# Patient Record
(demographics unavailable — no encounter records)

---

## 2024-11-03 NOTE — REVIEW OF SYSTEMS
[Murmur] : murmur [Premenarchal] : premenarchal [Headache] : headache [Eczema] : eczema [Negative] : Endocrine [Fever] : no fever [Pneumonia] : no pneumonia [Chest Pain] : no chest pain [FreeTextEntry9] : receives PT for R knee - periphyseal edema versus stress reaction on MRI [FreeTextEntry5] : h/o palpitations - saw Cardio and cleared per their note [FreeTextEntry8] : h/o urinary frequency  [de-identified] : saw Neuro - normal MRI head; no difficulty ambulating  [de-identified] : autoimmune hemolytic anemia [de-identified] : environmental allergies

## 2024-11-03 NOTE — HISTORY OF PRESENT ILLNESS
[de-identified] : Shaista is a 13 year old girl with h/o autoimmune hemolytic anemia (followed by Dr. Dillon of Heme/Onc, last Hgb 7/2024 was normal 12.5), h/o urinary frequency (resolved, saw Uro), h/o H pylori (successfully treated), previously seen for nausea, vomiting, abdominal pain, and constipation.  The workup was essentially normal including upper endoscopy and gastric emptying scan.  KUB with large amount of stool in colon and rectum.   She was last seen in April 2022.  Symptoms had resolved but she returns today due to abdominal pain which started 2 weeks ago.    The pain is located in the epigastrium and supraumbilical area without radiation.  It occurs almost daily including weekends, and is intermittent over the course of the day.  The pain is crampy and burning in quality, and 7 out of 10 in severity. Shaista is unsure whether the pain is related to eating.  It does not improve after stooling but she experiences incomplete evacuation.  Pepto unhelpful with abdominal pain but helps with stooling.  Mylanta helps with abdominal pain and constipation.  Omeprazole 40 mg prn helps with abdominal pain.  She has not had a workup.    When Shaista has menses, the upper abdominal pain is severe and she also has lower abdominal pain. She awakens overnight and has missed school from pain only when has menses.  Midol helps with pain.  PMD recommended GYN for severe pain, nausea and vomiting during menses. LMP was 10/21-10/25. Then did not have abdominal pain until yesterday.  Shaista has a good appetite and is gaining weight.  There is no report of dysphagia or odynophagia.   Shaista stools 2-3 times almost daily, [place] 3, without straining or rectal pain.  The last BM was yesterday. There is no blood, mucus, steatorrhea, fecal accidents or diarrhea.  She is not gassy.   [de-identified] : 9/2021 CRP 0.42 (ULN 0.49). 10/2021 CMP significant for glucose 63.  Normal CBC, lipase, TSH, TTG IgA with serum IgA.  11/23/21 Chem with severe hemolysis, K7.8. Normal BUN/Cr. Bicarb 22. [de-identified] : 5/2021  BITA neg [de-identified] : 3/2021 KUB without large stool burden. 10/27/21 normal  [de-identified] : 11/23/21 Patchy erythema in antrum > gastric body. Biopsies with mild chronic inactive gastritis. Hp neg.    [de-identified] : 10/27/21 normal  [de-identified] : 11/23/21 EKG read by Cardio as normal sinus rhythm and possible LVH. 2/2022 GE scan normal for both liquid and solid phase.

## 2024-11-03 NOTE — REVIEW OF SYSTEMS
[Murmur] : murmur [Premenarchal] : premenarchal [Headache] : headache [Eczema] : eczema [Negative] : Endocrine [Fever] : no fever [Pneumonia] : no pneumonia [Chest Pain] : no chest pain [FreeTextEntry5] : h/o palpitations - saw Cardio and cleared per their note [FreeTextEntry9] : receives PT for R knee - periphyseal edema versus stress reaction on MRI [FreeTextEntry8] : h/o urinary frequency  [de-identified] : saw Neuro - normal MRI head; no difficulty ambulating  [de-identified] : autoimmune hemolytic anemia [de-identified] : environmental allergies

## 2024-11-03 NOTE — PHYSICAL EXAM
[Well Developed] : well developed [Well Nourished] : well nourished [NAD] : in no acute distress [Alert and Active] : alert and active [Moist & Pink Mucous Membranes] : moist and pink mucous membranes [Normal Oropharynx] : the oropharynx was normal [CTAB] : lungs clear to auscultation bilaterally [Regular Rate and Rhythm] : regular rate and rhythm [Normal S1, S2] : normal S1 and S2 [Soft] : soft  [Obese] : obese [Normal Bowel Sounds] : normal bowel sounds [No Back Lesion] : no back lesion [Normal rectal exam] : exam was normal [Rectal Exam Deferred] : rectal exam was deferred [Verbal] : verbal [Well-Perfused] : well-perfused [Interactive] : interactive [Normal Position] : normal position [icteric] : anicteric [Oral Ulcers] : no oral ulcers [Respiratory Distress] : no respiratory distress  [Tender] : non tender [Stool Palpable] : no stool palpable [Tags] : no skin tags  [Fissure] : no anal fissures  [Hemorrhoids] : no hemorrhoids [Joint Swelling] : no joint swelling [Cyanosis] : no cyanosis [Rash] : no rash [Jaundice] : no jaundice [de-identified] : difficult to assess HSM due to habitus

## 2024-11-03 NOTE — CONSULT LETTER
[Dear  ___] : Dear ~RIVAS, [Courtesy Letter:] : I had the pleasure of seeing your patient, [unfilled], in my office today. [Please see my note below.] : Please see my note below. [Consult Closing:] : Thank you very much for allowing me to participate in the care of this patient.  If you have any questions, please do not hesitate to contact me. [Sincerely,] : Sincerely, [Dear  ___] : Dear  [unfilled], [FreeTextEntry3] : Micaela Edouard MD \par  [facility]

## 2024-11-03 NOTE — PAST MEDICAL HISTORY
[At Term] : at term [Normal Vaginal Route] : by normal vaginal route [None] : there were no delivery complications [] : There were no problems passing meconium within 24 - 48 hrs of life [FreeTextEntry1] : 6lbs

## 2024-11-03 NOTE — REVIEW OF SYSTEMS
[Murmur] : murmur [Premenarchal] : premenarchal [Headache] : headache [Eczema] : eczema [Negative] : Endocrine [Fever] : no fever [Pneumonia] : no pneumonia [Chest Pain] : no chest pain [FreeTextEntry9] : receives PT for R knee - periphyseal edema versus stress reaction on MRI [FreeTextEntry5] : h/o palpitations - saw Cardio and cleared per their note [FreeTextEntry8] : h/o urinary frequency  [de-identified] : saw Neuro - normal MRI head; no difficulty ambulating  [de-identified] : autoimmune hemolytic anemia [de-identified] : environmental allergies

## 2024-11-03 NOTE — CONSULT LETTER
[Dear  ___] : Dear ~RIVAS, [Courtesy Letter:] : I had the pleasure of seeing your patient, [unfilled], in my office today. [Please see my note below.] : Please see my note below. [Consult Closing:] : Thank you very much for allowing me to participate in the care of this patient.  If you have any questions, please do not hesitate to contact me. [Sincerely,] : Sincerely, [Dear  ___] : Dear  [unfilled], [FreeTextEntry3] : Micaela Edouard MD \par  [facility]

## 2024-11-03 NOTE — ASSESSMENT
[Discussed with Family to Call in ____ week(s) for Test Results] : discussed with family to call in [unfilled] week(s) to obtain test results and with update on child's condition.  Family should call sooner if clinically indicated. [Educated Patient & Family about Diagnosis] : educated the patient and family about the diagnosis [FreeTextEntry1] : ASSESSMENT: 1.  History of nausea, vomiting and abdominal pain improved on PPI and oil of oregano - all symptoms resolved; normal workup including upper endoscopy and gastric emptying scan essentially  2.  Acute abdominal pain - improved on PPI and Mylanta, ?from constipation  3.  Incomplete evacuation - improved on PeptoBismol and Mylanta 4.  History of urinary frequency, not improved after constipation improved - saw Uro 5.  Autoimmune hemolytic anemia - follows Heme  6.  Headaches - follows Neuro, normal head MRI 7.  EKG read by Cardio as normal sinus rhythm and possible LVH - cleared by Cardio 8.  Obesity  PLAN: -  Reviewed chart including prior GI notes and subspecialty notes. -  KUB to decide about a cleanout.   If minimal stool or if cleanout unhelpful with pain, will order labs.   -  Pt deferred seeing someone for weight.  -  Follow up in GI clinic on 11/13 at 4:30p as overbook (in [place]).  Family to schedule next available appt after 3:30p in [place] office since they prefer after school hours.  Family to call if problems.  All questions answered.   ADDENDUM: Oct 31, 2024 - Reviewed KUB dated 10/31/24 from [facility] Radiology: moderate stool burden in colon and rectum (on my review, official report not available yet).  Requested RN to call family with result and plan for cleanout with Fleet enema, followed by Magnesium Citrate 10 oz x2 (both e-scribed).  After cleanout, monitor abdominal pain and give Miralax 1/2 cap once daily (e-scribed). Titrate dose as needed to produce more stool than baseline (max 1 cap per day). Decrease dose if have diarrhea.

## 2024-11-03 NOTE — ASSESSMENT
[Discussed with Family to Call in ____ week(s) for Test Results] : discussed with family to call in [unfilled] week(s) to obtain test results and with update on child's condition.  Family should call sooner if clinically indicated. [Educated Patient & Family about Diagnosis] : educated the patient and family about the diagnosis [FreeTextEntry1] : ASSESSMENT: 1.  History of nausea, vomiting and abdominal pain improved on PPI and oil of oregano - all symptoms resolved; normal workup including upper endoscopy and gastric emptying scan essentially  2.  Acute abdominal pain - improved on PPI and Mylanta, ?from constipation  3.  Incomplete evacuation - improved on PeptoBismol and Mylanta 4.  History of urinary frequency, not improved after constipation improved - saw Uro 5.  Autoimmune hemolytic anemia - follows Heme  6.  Headaches - follows Neuro, normal head MRI 7.  EKG read by Cardio as normal sinus rhythm and possible LVH - cleared by Cardio 8.  Obesity  PLAN: -  Reviewed chart including prior GI notes and subspecialty notes. -  KUB to decide about a cleanout.   If minimal stool or if cleanout unhelpful with pain, will order labs.   -  Pt deferred seeing someone for weight.  -  Follow up in GI clinic on 11/13 at 4:30p as overbook (in [place]).  Family to schedule next available appt after 3:30p in [place] office since they prefer after school hours.  Family to call if problems.  All questions answered.   ADDENDUM: Oct 31, 2024 - Reviewed KUB dated 10/31/24 from [facility] Radiology: moderate stool burden in colon and rectum (on my review, official report not available yet).  Requested RN to call family with result and plan for cleanout with Fleet enema, followed by Magnesium Citrate 10 oz x2 (both e-scribed).  After cleanout, monitor abdominal pain and give Miralax 1/2 cap once daily (e-scribed). Titrate dose as needed to produce more stool than baseline (max 1 cap per day). Decrease dose if have diarrhea.

## 2024-11-03 NOTE — HISTORY OF PRESENT ILLNESS
[de-identified] : Shaista is a 13 year old girl with h/o autoimmune hemolytic anemia (followed by Dr. Dillon of Heme/Onc, last Hgb 7/2024 was normal 12.5), h/o urinary frequency (resolved, saw Uro), h/o H pylori (successfully treated), previously seen for nausea, vomiting, abdominal pain, and constipation.  The workup was essentially normal including upper endoscopy and gastric emptying scan.  KUB with large amount of stool in colon and rectum.   She was last seen in April 2022.  Symptoms had resolved but she returns today due to abdominal pain which started 2 weeks ago.    The pain is located in the epigastrium and supraumbilical area without radiation.  It occurs almost daily including weekends, and is intermittent over the course of the day.  The pain is crampy and burning in quality, and 7 out of 10 in severity. Shaista is unsure whether the pain is related to eating.  It does not improve after stooling but she experiences incomplete evacuation.  Pepto unhelpful with abdominal pain but helps with stooling.  Mylanta helps with abdominal pain and constipation.  Omeprazole 40 mg prn helps with abdominal pain.  She has not had a workup.    When Shaista has menses, the upper abdominal pain is severe and she also has lower abdominal pain. She awakens overnight and has missed school from pain only when has menses.  Midol helps with pain.  PMD recommended GYN for severe pain, nausea and vomiting during menses. LMP was 10/21-10/25. Then did not have abdominal pain until yesterday.  Shaista has a good appetite and is gaining weight.  There is no report of dysphagia or odynophagia.   Shaista stools 2-3 times almost daily, [place] 3, without straining or rectal pain.  The last BM was yesterday. There is no blood, mucus, steatorrhea, fecal accidents or diarrhea.  She is not gassy.   [de-identified] : 9/2021 CRP 0.42 (ULN 0.49). 10/2021 CMP significant for glucose 63.  Normal CBC, lipase, TSH, TTG IgA with serum IgA.  11/23/21 Chem with severe hemolysis, K7.8. Normal BUN/Cr. Bicarb 22. [de-identified] : 5/2021  BITA neg [de-identified] : 3/2021 KUB without large stool burden. 10/27/21 normal  [de-identified] : 10/27/21 normal  [de-identified] : 11/23/21 Patchy erythema in antrum > gastric body. Biopsies with mild chronic inactive gastritis. Hp neg.    [de-identified] : 11/23/21 EKG read by Cardio as normal sinus rhythm and possible LVH. 2/2022 GE scan normal for both liquid and solid phase.

## 2024-11-03 NOTE — PHYSICAL EXAM
[Well Developed] : well developed [Well Nourished] : well nourished [NAD] : in no acute distress [Alert and Active] : alert and active [Moist & Pink Mucous Membranes] : moist and pink mucous membranes [Normal Oropharynx] : the oropharynx was normal [CTAB] : lungs clear to auscultation bilaterally [Regular Rate and Rhythm] : regular rate and rhythm [Normal S1, S2] : normal S1 and S2 [Soft] : soft  [Obese] : obese [Normal Bowel Sounds] : normal bowel sounds [No Back Lesion] : no back lesion [Normal rectal exam] : exam was normal [Rectal Exam Deferred] : rectal exam was deferred [Verbal] : verbal [Well-Perfused] : well-perfused [Interactive] : interactive [Normal Position] : normal position [icteric] : anicteric [Oral Ulcers] : no oral ulcers [Respiratory Distress] : no respiratory distress  [Tender] : non tender [Stool Palpable] : no stool palpable [Tags] : no skin tags  [Fissure] : no anal fissures  [Hemorrhoids] : no hemorrhoids [Joint Swelling] : no joint swelling [Cyanosis] : no cyanosis [Rash] : no rash [Jaundice] : no jaundice [de-identified] : difficult to assess HSM due to habitus

## 2024-11-03 NOTE — PHYSICAL EXAM
[Well Developed] : well developed [Well Nourished] : well nourished [NAD] : in no acute distress [Alert and Active] : alert and active [Moist & Pink Mucous Membranes] : moist and pink mucous membranes [Normal Oropharynx] : the oropharynx was normal [CTAB] : lungs clear to auscultation bilaterally [Regular Rate and Rhythm] : regular rate and rhythm [Normal S1, S2] : normal S1 and S2 [Soft] : soft  [Obese] : obese [Normal Bowel Sounds] : normal bowel sounds [No Back Lesion] : no back lesion [Normal rectal exam] : exam was normal [Rectal Exam Deferred] : rectal exam was deferred [Verbal] : verbal [Well-Perfused] : well-perfused [Interactive] : interactive [Normal Position] : normal position [icteric] : anicteric [Oral Ulcers] : no oral ulcers [Respiratory Distress] : no respiratory distress  [Tender] : non tender [Stool Palpable] : no stool palpable [Tags] : no skin tags  [Fissure] : no anal fissures  [Hemorrhoids] : no hemorrhoids [Joint Swelling] : no joint swelling [Cyanosis] : no cyanosis [Rash] : no rash [Jaundice] : no jaundice [de-identified] : difficult to assess HSM due to habitus

## 2024-11-03 NOTE — HISTORY OF PRESENT ILLNESS
[de-identified] : Shaista is a 13 year old girl with h/o autoimmune hemolytic anemia (followed by Dr. Dillon of Heme/Onc, last Hgb 7/2024 was normal 12.5), h/o urinary frequency (resolved, saw Uro), h/o H pylori (successfully treated), previously seen for nausea, vomiting, abdominal pain, and constipation.  The workup was essentially normal including upper endoscopy and gastric emptying scan.  KUB with large amount of stool in colon and rectum.   She was last seen in April 2022.  Symptoms had resolved but she returns today due to abdominal pain which started 2 weeks ago.    The pain is located in the epigastrium and supraumbilical area without radiation.  It occurs almost daily including weekends, and is intermittent over the course of the day.  The pain is crampy and burning in quality, and 7 out of 10 in severity. Shaista is unsure whether the pain is related to eating.  It does not improve after stooling but she experiences incomplete evacuation.  Pepto unhelpful with abdominal pain but helps with stooling.  Mylanta helps with abdominal pain and constipation.  Omeprazole 40 mg prn helps with abdominal pain.  She has not had a workup.    When Shaista has menses, the upper abdominal pain is severe and she also has lower abdominal pain. She awakens overnight and has missed school from pain only when has menses.  Midol helps with pain.  PMD recommended GYN for severe pain, nausea and vomiting during menses. LMP was 10/21-10/25. Then did not have abdominal pain until yesterday.  Shaista has a good appetite and is gaining weight.  There is no report of dysphagia or odynophagia.   Shaista stools 2-3 times almost daily, [place] 3, without straining or rectal pain.  The last BM was yesterday. There is no blood, mucus, steatorrhea, fecal accidents or diarrhea.  She is not gassy.   [de-identified] : 9/2021 CRP 0.42 (ULN 0.49). 10/2021 CMP significant for glucose 63.  Normal CBC, lipase, TSH, TTG IgA with serum IgA.  11/23/21 Chem with severe hemolysis, K7.8. Normal BUN/Cr. Bicarb 22. [de-identified] : 5/2021  BITA neg [de-identified] : 3/2021 KUB without large stool burden. 10/27/21 normal  [de-identified] : 10/27/21 normal  [de-identified] : 11/23/21 Patchy erythema in antrum > gastric body. Biopsies with mild chronic inactive gastritis. Hp neg.    [de-identified] : 11/23/21 EKG read by Cardio as normal sinus rhythm and possible LVH. 2/2022 GE scan normal for both liquid and solid phase.

## 2024-11-03 NOTE — ASSESSMENT
[Discussed with Family to Call in ____ week(s) for Test Results] : discussed with family to call in [unfilled] week(s) to obtain test results and with update on child's condition.  Family should call sooner if clinically indicated. [Educated Patient & Family about Diagnosis] : educated the patient and family about the diagnosis [FreeTextEntry1] : ASSESSMENT: 1.  History of nausea, vomiting and abdominal pain improved on PPI and oil of oregano - all symptoms resolved; normal workup including upper endoscopy and gastric emptying scan essentially  2.  Acute abdominal pain - improved on PPI and Mylanta, ?from constipation  3.  Incomplete evacuation - improved on PeptoBismol and Mylanta 4.  History of urinary frequency, not improved after constipation improved - saw Uro 5.  Autoimmune hemolytic anemia - follows Heme  6.  Headaches - follows Neuro, normal head MRI 7.  EKG read by Cardio as normal sinus rhythm and possible LVH - cleared by Cardio 8.  Obesity  PLAN: -  Reviewed chart including prior GI notes and subspecialty notes. -  KUB to decide about a cleanout.   If minimal stool or if cleanout unhelpful with pain, will order labs.   -  Pt deferred seeing someone for weight.  -  Follow up in GI clinic on 11/13 at 4:30p as overbook (in [place]).  Family to schedule next available appt after 3:30p in [place] office since they prefer after school hours.  Family to call if problems.  All questions answered.   ADDENDUM: Oct 31, 2024 - Reviewed KUB dated 10/31/24 from [facility]: moderate stool burden in colon and rectum (on my review, official report not available yet).  Requested RN to call family with result and plan for cleanout with Fleet enema, followed by Magnesium Citrate 10 oz x2 (both e-scribed).  After cleanout, monitor abdominal pain and give Miralax 1/2 cap once daily (e-scribed). Titrate dose as needed to produce more stool than baseline (max 1 cap per day). Decrease dose if have diarrhea.

## 2024-11-03 NOTE — CONSULT LETTER
[Dear  ___] : Dear ~RIVAS, [Courtesy Letter:] : I had the pleasure of seeing your patient, [unfilled], in my office today. [Please see my note below.] : Please see my note below. [Consult Closing:] : Thank you very much for allowing me to participate in the care of this patient.  If you have any questions, please do not hesitate to contact me. [Sincerely,] : Sincerely, [Dear  ___] : Dear  [unfilled], [FreeTextEntry3] : Micaela Edouard MD \par  [facility]

## 2024-11-11 NOTE — REASON FOR VISIT
[Consultation Follow Up] : a consultation follow up  [Mother] : mother [Patient] : patient [Family Member] : family member

## 2024-11-11 NOTE — PHYSICAL EXAM
[Well Developed] : well developed [Well Nourished] : well nourished [NAD] : in no acute distress [Alert and Active] : alert and active [Moist & Pink Mucous Membranes] : moist and pink mucous membranes [Normal Oropharynx] : the oropharynx was normal [CTAB] : lungs clear to auscultation bilaterally [Regular Rate and Rhythm] : regular rate and rhythm [Normal S1, S2] : normal S1 and S2 [Soft] : soft  [Obese] : obese [Normal Bowel Sounds] : normal bowel sounds [Verbal] : verbal [Well-Perfused] : well-perfused [Interactive] : interactive

## 2025-04-15 NOTE — HISTORY OF PRESENT ILLNESS
[FreeTextEntry1] : Pediatric & Adolescent Gynecology: New Patient Visit   MARIELA is a(n) 14 year old female ( Dec 21 2010) presenting for history of autoimmune hemolytic anemia (AIHA), obesity and dysmenorrhea.    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 Berger) for nausea, vomiting, abdominal pain, and constipation When Mariela has menses, the upper abdominal pain is severe and she also has lower abdominal pain She awakens overnight and has missed school from pain only when has menses Midol helps with pain  PMD recommended GYN for severe pain, nausea and vomiting during menses.   Today 2025: pt is here with   Menstrual history: Menarche? Cycles: *regular Duration of periods: Flow: Period products: Cramps: - takes LMP?? Prior periods:       * to be used if the visit is addressing menstrual periods / hormonal medication Bleeding history: -No personal history of frequent/prolonged nosebleeds, easy bruising, excess bleeding with injury. -Has never had surgery (or) Had surgery w/o bleeding problems: -No known family history of bleeding disorders. Estrogen contraindications: -No personal history of DVT/PE/clotting disorder, migraines w/aura, HTN, DM, dyslipidemia, CVD, renal or liver disease, IBD, SLE w/ APLA, malignancy. -No close family history of DVT/PE/clotting disorder.      Social / Confidential history: obtained? Lives with:? - feels safe - no h/o abuse School: * grade - after HS: Activities/Hobbies: - Mood: generally good - no h/o depression/anxiety Dating/Relationships: - current: - past: Interested in?[boys/girls/both/not sure]: - Sexual activity: - pre-sexual activity: Gender/Pronouns: